# Patient Record
(demographics unavailable — no encounter records)

---

## 2024-11-26 NOTE — ASSESSMENT
[FreeTextEntry1] : Impression) clinically DANIEL  Plan) reviewed current clinical information and imaging with the patient.  No evidence of any recurrent as per NCCN guidelines we will reimage in 1 year and at that point will be 5 years from the surgery and probably can stop follow-up at that time.  All questions answered.  Sudhir Pichardo MD  Chief Surgical Oncology Multidisciplinary GI cancer program [facility]  Professor Surgery [employer]   CC Dr. Rickey Osuna  Time spent reviewing data and in discussion 20 minutes

## 2024-11-26 NOTE — REASON FOR VISIT
[Home] : at home, [unfilled] , at the time of the visit. [[facility] Office: ([facility]___] : at the medical office located in

## 2024-11-26 NOTE — REASON FOR VISIT
[Home] : at home, [unfilled] , at the time of the visit. [Medical Office: ([facility])___] : at the medical office located in

## 2024-11-26 NOTE — HISTORY OF PRESENT ILLNESS
[de-identified] : Patient Name: KAL IRAHETA  MRN: [id number]  [place] MRN: [id number] Referring Provider: Dr. Shawn Osuna Oncologist: Dr. Marley Mejias ([facility] [place] [facility]) Date:  11/26/24  Diagnosis: NET small bowel Operative date: 2/3/21 Procedure: Lap ileocolic resection Pathology: well differentiated NET, G1, 1.1cm, 18 negative LN, Ki 67% <3%, pT2N0  She presents for a follow up of a small bowel NET, 3 years 9 months s/p surgical resection.  1/26/21 - presented with an ileal NET which was seen on her screening colonoscopy (she is high risk d/t her history of DCIS and history of "precancerous polyp" seen in 2007 colonoscopy). Chromogranin A 17.1 2/3/21 - lap ileocolic resection 8/17/21 - CT CAP WWO with no suspicious mass or metastatic disease 5/10/22 - PET Dotatate with tiny implants with SUV max of 4.6 below common iliac bifurcation which may represent metastases, and a tiny focus of uptake adjacent to the left anterior clinoid process 5/26/22 - Chromogranin A 17.9 5/20/22 - MRI head showed a 6mm meningioma 5/31/22 - Referred to Neuro 8/30/22 - MRI head shows no change and will follow up in one year per neuro recommendations 11/21/22 - CT AP shows no metastasis 12/4/23 - CT AP shows no mets 11/19/24 - CT AP shows no evidence of a new mass, lymphadenopathy or disease recurrence. Chromogranin A is 22.0  She saw Dr. Osuna for indigestion after taking a multivitamin and started taking Pantoprazole 40mg daily. It is helping her symptoms. She stopped taking MVI and stopped drinking coffee.

## 2024-11-26 NOTE — ASSESSMENT
[FreeTextEntry1] : Impression) clinically DANIEL  Plan) reviewed current clinical information and imaging with the patient.  No evidence of any recurrent as per NCCN guidelines we will reimage in 1 year and at that point will be 5 years from the surgery and probably can stop follow-up at that time.  All questions answered.  Sudhir Pichardo MD  Chief Surgical Oncology Multidisciplinary GI cancer program [facility]  Professor Surgery [employer]   CC Dr. Rickey Osuna  Time spent reviewing data and in discussion 20 minutes

## 2024-11-26 NOTE — HISTORY OF PRESENT ILLNESS
[de-identified] : Patient Name: KAL IRAHETA  MRN: [id number]  [place] MRN: [id number] Referring Provider: Dr. Shawn Osuna Oncologist: Dr. Marley Mejias ([facility]) Date:  11/26/24  Diagnosis: NET small bowel Operative date: 2/3/21 Procedure: Lap ileocolic resection Pathology: well differentiated NET, G1, 1.1cm, 18 negative LN, Ki 67% <3%, pT2N0  She presents for a follow up of a small bowel NET, 3 years 9 months s/p surgical resection.  1/26/21 - presented with an ileal NET which was seen on her screening colonoscopy (she is high risk d/t her history of DCIS and history of "precancerous polyp" seen in 2007 colonoscopy). Chromogranin A 17.1 2/3/21 - lap ileocolic resection 8/17/21 - CT CAP WWO with no suspicious mass or metastatic disease 5/10/22 - PET Dotatate with tiny implants with SUV max of 4.6 below common iliac bifurcation which may represent metastases, and a tiny focus of uptake adjacent to the left anterior clinoid process 5/26/22 - Chromogranin A 17.9 5/20/22 - MRI head showed a 6mm meningioma 5/31/22 - Referred to Neuro 8/30/22 - MRI head shows no change and will follow up in one year per neuro recommendations 11/21/22 - CT AP shows no metastasis 12/4/23 - CT AP shows no mets 11/19/24 - CT AP shows no evidence of a new mass, lymphadenopathy or disease recurrence. Chromogranin A is 22.0  She saw Dr. Osuna for indigestion after taking a multivitamin and started taking Pantoprazole 40mg daily. It is helping her symptoms. She stopped taking MVI and stopped drinking coffee.